# Patient Record
Sex: MALE | Race: WHITE | Employment: OTHER | URBAN - METROPOLITAN AREA
[De-identification: names, ages, dates, MRNs, and addresses within clinical notes are randomized per-mention and may not be internally consistent; named-entity substitution may affect disease eponyms.]

---

## 2024-02-08 ENCOUNTER — LAB (OUTPATIENT)
Dept: LAB | Facility: HOSPITAL | Age: 69
End: 2024-02-08
Payer: COMMERCIAL

## 2024-02-08 ENCOUNTER — OFFICE VISIT (OUTPATIENT)
Dept: OTOLARYNGOLOGY | Facility: HOSPITAL | Age: 69
End: 2024-02-08
Payer: COMMERCIAL

## 2024-02-08 ENCOUNTER — APPOINTMENT (OUTPATIENT)
Dept: OTOLARYNGOLOGY | Facility: HOSPITAL | Age: 69
End: 2024-02-08
Payer: COMMERCIAL

## 2024-02-08 VITALS — TEMPERATURE: 97.5 F | WEIGHT: 184.1 LBS | HEIGHT: 69 IN | BODY MASS INDEX: 27.27 KG/M2

## 2024-02-08 DIAGNOSIS — K13.79 MASS OF ORAL CAVITY: ICD-10-CM

## 2024-02-08 LAB
CREAT SERPL-MCNC: 0.8 MG/DL (ref 0.5–1.3)
EGFRCR SERPLBLD CKD-EPI 2021: >90 ML/MIN/1.73M*2
HOLD SPECIMEN: NORMAL

## 2024-02-08 PROCEDURE — 36415 COLL VENOUS BLD VENIPUNCTURE: CPT

## 2024-02-08 PROCEDURE — 99205 OFFICE O/P NEW HI 60 MIN: CPT | Performed by: STUDENT IN AN ORGANIZED HEALTH CARE EDUCATION/TRAINING PROGRAM

## 2024-02-08 PROCEDURE — 1159F MED LIST DOCD IN RCRD: CPT | Performed by: STUDENT IN AN ORGANIZED HEALTH CARE EDUCATION/TRAINING PROGRAM

## 2024-02-08 PROCEDURE — 99215 OFFICE O/P EST HI 40 MIN: CPT | Performed by: STUDENT IN AN ORGANIZED HEALTH CARE EDUCATION/TRAINING PROGRAM

## 2024-02-08 PROCEDURE — 1036F TOBACCO NON-USER: CPT | Performed by: STUDENT IN AN ORGANIZED HEALTH CARE EDUCATION/TRAINING PROGRAM

## 2024-02-08 PROCEDURE — 82565 ASSAY OF CREATININE: CPT

## 2024-02-08 PROCEDURE — 1160F RVW MEDS BY RX/DR IN RCRD: CPT | Performed by: STUDENT IN AN ORGANIZED HEALTH CARE EDUCATION/TRAINING PROGRAM

## 2024-02-08 RX ORDER — LIDOCAINE 50 MG/G
PATCH TOPICAL
COMMUNITY
Start: 2023-12-05

## 2024-02-08 RX ORDER — TADALAFIL 20 MG/1
20 TABLET ORAL DAILY PRN
COMMUNITY
Start: 2024-01-23

## 2024-02-08 RX ORDER — LISINOPRIL AND HYDROCHLOROTHIAZIDE 10; 12.5 MG/1; MG/1
1 TABLET ORAL
COMMUNITY

## 2024-02-08 ASSESSMENT — PATIENT HEALTH QUESTIONNAIRE - PHQ9
1. LITTLE INTEREST OR PLEASURE IN DOING THINGS: NOT AT ALL
SUM OF ALL RESPONSES TO PHQ9 QUESTIONS 1 & 2: 0
2. FEELING DOWN, DEPRESSED OR HOPELESS: NOT AT ALL

## 2024-02-08 NOTE — PROGRESS NOTES
Chief Complaint:    Chief Complaint   Patient presents with    Follow-up     Left side neck mass-biopsy results.       History of Present Illness:  68 y.o. male presents to [facility] on 02/08/24 with concern for a left oropharyngeal cancer.  The patient is unsure when his original symptoms began, but he noticed a mass in his left neck about a month ago after he went for massage.  Since then he has been having some increased discomfort in the area.  He was seen and referred by Dr. Bailey who he saw Monday.  A CT neck was done showing a large left base of tongue and oropharynx lesion as well as bilateral lymphadenopathy.  It appears that a biopsy of the left oropharynx lesion was done in the office-pathology is still pending.    The patient reports moderate dysphagia but has not noticed significant weight loss.  He denies significant difficulty breathing but his wife notes that he has been snoring more.    The patient's CT neck was reviewed which showed a large left tonsil on and base of tongue mass with the bilateral necrotic lymph nodes, left greater than right.    The patient denies smoking.  He does drink daily.    Past Medical History  No past medical history on file.    Past Surgical History  No past surgical history on file.    Social History  Social History     Socioeconomic History    Marital status: Single     Spouse name: Not on file    Number of children: Not on file    Years of education: Not on file    Highest education level: Not on file   Occupational History    Not on file   Tobacco Use    Smoking status: Never    Smokeless tobacco: Never   Substance and Sexual Activity    Alcohol use: Not on file    Drug use: Not on file    Sexual activity: Not on file   Other Topics Concern    Not on file   Social History Narrative    Not on file     Social Determinants of Health     Financial Resource Strain: Not on file   Food Insecurity: Not on file   Transportation Needs: Not  "on file   Physical Activity: Not on file   Stress: Not on file   Social Connections: Not on file   Intimate Partner Violence: Not on file   Housing Stability: Not on file       Family History  No family history on file.    Medications:  Current Outpatient Medications   Medication Sig Dispense Refill    lidocaine (Lidoderm) 5 % patch USE 1 PATCH EXTERNALLY ONCE DAILY MAY BE APPLIED FOR UP TO 12 HOURS IN A 24 HOUR PERIOD      lisinopriL-hydrochlorothiazide 10-12.5 mg tablet Take 1 tablet by mouth once daily in the morning. Take before meals.      tadalafil 20 mg tablet Take 1 tablet (20 mg) by mouth once daily as needed for erectile dysfunction.       No current facility-administered medications for this visit.       Allergies: Patient has no known allergies.    Immunizations:   There is no immunization history on file for this patient.     Review of Systems:  Constitutional: Negative for fever, weight loss and weight gain  HENT: Negative for ear pain, sore throat and hoarseness.  Mild difficulty swallowing  Cardiovascular: Negative for chest pain and dyspnea on exertion (Can climb up 2 floors)  Respiratory: Is not experiencing shortness of breath  Gastrointestinal: Negative for nausea and vomiting  Neurological: Negative for headaches.   Psychiatric: The patient is not nervous/anxious   Musculoskeletal: Denies muscle pain/weakness  Heme/Lymph: Negative for lymph nodes, easy bruising    Physical Exam  Vital Signs:  Temp 36.4 °C (97.5 °F)   Ht 1.753 m (5' 9\")   Wt 83.5 kg (184 lb 1.6 oz)   BMI 27.19 kg/m²     General:  Well-developed, well-nourished. No distress.  Average habitus.  Family in room  Communication and Voice:  Clear pitch and clarity  Respiratory  Respiratory effort:  Equal inspiration and expiration without stridor  Cardiovascular  Peripheral Vascular:  Warm extremities with equal pulses  Neuro: Patient oriented to person, place, and time;  Appropriate mood and affect;  Gait is intact with no " imbalance; Cranial nerves II-XII are intact  Head and Face  Inspection:  Normocephalic and atraumatic without mass or lesion  Palpation:  Facial skeleton intact without bony stepoffs  Facial Strength:  Facial motility symmetric and full bilaterally  Eyes: PERRLA. No nystagmus with normal extraocular motion bilaterally  ENT  Pinna:  External ear intact and fully developed  External canal:  Canal is patent with intact skin  Tympanic Membrane:  Clear and mobile  External nose:  No scar or anatomic deformity  Internal Nose:  Septum intact and midline.  No edema, polyp, or rhinorrhea.  TMJ:  No pain to palpation with full mobility  Salivary Glands:  No mass or tenderness  Lips: No lesion.  Oral cavity: No mass or lesion.  Upper and lower teeth present  Oropharynx: There is a obvious approximately 2 to 3 cm asymmetric fullness to the left tonsil compared to the right.  This seems to extend to the base of tongue   Neck  Trachea:  Midline trachea.  Thyroid:  No mass or nodularity  Lymphatics: Bilateral cervical lymphadenopathy, left greater than right    Procedure note: Recommended flexible nasopharyngoscopy. Risks, benefits, and alternatives were explained.   Procedure: Flexible nasopharyngoscopy   Indication: Left oropharynx lesion  Anesthesia: 4% lidocaine and 0.5% phenylephrine   After adequate Afrin and lidocaine spray advance the flexible endoscope. I was able to visualize the nasal cavity and nasopharynx. The findings were notable for the following:  No masses/lesions/ulcers in the nasopharynx.  There is an obvious left tonsil mass that extends to the left base of tongue.  This mass is extending down to but not into the vallecula.  It is causing some obstruction over the airway, but the epiglottis as well as vocal cords appear normal distal to the mass.  Bilateral vocal cord motion with complete glottic closure. No laryngeal lesions. No pooling of secretions.    Impression/Plan:  68 y.o. male presents to [place]  [facility] on 02/08/24 with concern for a left oropharyngeal cancer.  The patient appears to have a very obvious left oropharynx mass and bilateral neck lymphadenopathy that is concerning for an squamous cell carcinoma.  I suspect that chemoradiation would be the best course of treatment    -Will follow-up on the patient's recent biopsy results   -Will obtain a PET scan  -Will arrange for patient to see dental, medical oncology, and radiation oncology  -Will plan for virtual follow-up in 2 weeks

## 2024-02-26 ENCOUNTER — HOSPITAL ENCOUNTER (OUTPATIENT)
Dept: RADIOLOGY | Facility: HOSPITAL | Age: 69
Discharge: HOME | End: 2024-02-26
Payer: MEDICARE

## 2024-02-26 DIAGNOSIS — K13.79 MASS OF ORAL CAVITY: ICD-10-CM

## 2024-02-26 LAB — GLUCOSE BLD MANUAL STRIP-MCNC: 95 MG/DL (ref 74–99)

## 2024-02-26 PROCEDURE — 78815 PET IMAGE W/CT SKULL-THIGH: CPT | Mod: PET TUMOR INIT TX STRAT | Performed by: NUCLEAR MEDICINE

## 2024-02-26 PROCEDURE — A9552 F18 FDG: HCPCS | Performed by: STUDENT IN AN ORGANIZED HEALTH CARE EDUCATION/TRAINING PROGRAM

## 2024-02-26 PROCEDURE — 3430000001 HC RX 343 DIAGNOSTIC RADIOPHARMACEUTICALS: Performed by: STUDENT IN AN ORGANIZED HEALTH CARE EDUCATION/TRAINING PROGRAM

## 2024-02-26 PROCEDURE — 78815 PET IMAGE W/CT SKULL-THIGH: CPT | Mod: PI

## 2024-02-26 PROCEDURE — 82947 ASSAY GLUCOSE BLOOD QUANT: CPT

## 2024-02-26 RX ORDER — FLUDEOXYGLUCOSE F 18 200 MCI/ML
12.34 INJECTION, SOLUTION INTRAVENOUS
Status: COMPLETED | OUTPATIENT
Start: 2024-02-26 | End: 2024-02-26

## 2024-02-26 RX ADMIN — FLUDEOXYGLUCOSE F 18 12.34 MILLICURIE: 200 INJECTION, SOLUTION INTRAVENOUS at 11:00

## 2024-02-29 ENCOUNTER — OFFICE VISIT (OUTPATIENT)
Dept: OTOLARYNGOLOGY | Facility: HOSPITAL | Age: 69
End: 2024-02-29
Payer: MEDICARE

## 2024-02-29 VITALS — BODY MASS INDEX: 27.11 KG/M2 | TEMPERATURE: 96.6 F | WEIGHT: 183 LBS | HEIGHT: 69 IN

## 2024-02-29 DIAGNOSIS — K13.79 MASS OF ORAL CAVITY: Primary | ICD-10-CM

## 2024-02-29 DIAGNOSIS — R13.12 OROPHARYNGEAL DYSPHAGIA: ICD-10-CM

## 2024-02-29 DIAGNOSIS — C10.9 MALIGNANT NEOPLASM OF OROPHARYNX (MULTI): ICD-10-CM

## 2024-02-29 PROCEDURE — 1036F TOBACCO NON-USER: CPT | Performed by: STUDENT IN AN ORGANIZED HEALTH CARE EDUCATION/TRAINING PROGRAM

## 2024-02-29 PROCEDURE — 1160F RVW MEDS BY RX/DR IN RCRD: CPT | Performed by: STUDENT IN AN ORGANIZED HEALTH CARE EDUCATION/TRAINING PROGRAM

## 2024-02-29 PROCEDURE — 1159F MED LIST DOCD IN RCRD: CPT | Performed by: STUDENT IN AN ORGANIZED HEALTH CARE EDUCATION/TRAINING PROGRAM

## 2024-02-29 PROCEDURE — 99215 OFFICE O/P EST HI 40 MIN: CPT | Performed by: STUDENT IN AN ORGANIZED HEALTH CARE EDUCATION/TRAINING PROGRAM

## 2024-02-29 RX ORDER — BISMUTH SUBSALICYLATE 262 MG
1 TABLET,CHEWABLE ORAL DAILY
COMMUNITY

## 2024-02-29 ASSESSMENT — PATIENT HEALTH QUESTIONNAIRE - PHQ9
2. FEELING DOWN, DEPRESSED OR HOPELESS: NOT AT ALL
SUM OF ALL RESPONSES TO PHQ9 QUESTIONS 1 & 2: 0
1. LITTLE INTEREST OR PLEASURE IN DOING THINGS: NOT AT ALL

## 2024-02-29 NOTE — PROGRESS NOTES
Chief Complaint:    Chief Complaint   Patient presents with    Follow-up       History of Present Illness:  68 y.o. male presents to [facility] on 02/29/24 with concern for a left oropharyngeal cancer.  The patient is unsure when his original symptoms began, but he noticed a mass in his left neck about a month ago after he went for massage.  Since then he has been having some increased discomfort in the area.  He was seen and referred by Dr. Bailey who he saw Monday.  A CT neck was done showing a large left base of tongue and oropharynx lesion as well as bilateral lymphadenopathy.  It appears that a biopsy of the left oropharynx lesion was done in the office-pathology is still pending.    The patient reports moderate dysphagia but has not noticed significant weight loss.  He denies significant difficulty breathing but his wife notes that he has been snoring more.    The patient's CT neck was reviewed which showed a large left tonsil on and base of tongue mass with the bilateral necrotic lymph nodes, left greater than right.    The patient denies smoking.  He does drink daily.    02/29/24  The patient presents today for follow-up.  Since his last visit, his pathology has returned as:    Left base of tongue, biopsy:  -Invasive adenosquamous carcinoma, HPV associated (see comment).    The patient also had a PET scan showing:    IMPRESSION:  1. Large FDG avid lesion in the left oropharynx involving the tongue  and pharyngeal space consistent with biopsy-proven adenosquamous  carcinoma. There are multiple bilateral FDG avid lymph nodes  representing lymph node metastases.  2. There are a few hypodense FDG avid lesions throughout the liver  which are concerning for metastatic disease.  3. No evidence of additional FDG avid lymphadenopathy or additional  metastatic disease.    The patient is establish care with Dr. Brown and Dr. Bermudez from radiation and medical oncology.  The patient is  scheduled to start primary chemoradiation on 3/12/2024.  His breathing and eating and drinking has been okay.  He denies any weight loss.    Past Medical History  No past medical history on file.    Past Surgical History  No past surgical history on file.    Social History  Social History     Socioeconomic History    Marital status: Single     Spouse name: Not on file    Number of children: Not on file    Years of education: Not on file    Highest education level: Not on file   Occupational History    Not on file   Tobacco Use    Smoking status: Never    Smokeless tobacco: Never   Substance and Sexual Activity    Alcohol use: Not on file    Drug use: Not on file    Sexual activity: Not on file   Other Topics Concern    Not on file   Social History Narrative    Not on file     Social Determinants of Health     Financial Resource Strain: Not on file   Food Insecurity: Not on file   Transportation Needs: Not on file   Physical Activity: Not on file   Stress: Not on file   Social Connections: Not on file   Intimate Partner Violence: Not on file   Housing Stability: Not on file       Family History  No family history on file.    Medications:  Current Outpatient Medications   Medication Sig Dispense Refill    lidocaine (Lidoderm) 5 % patch USE 1 PATCH EXTERNALLY ONCE DAILY MAY BE APPLIED FOR UP TO 12 HOURS IN A 24 HOUR PERIOD      lisinopriL-hydrochlorothiazide 10-12.5 mg tablet Take 1 tablet by mouth once daily in the morning. Take before meals.      multivitamin tablet Take 1 tablet by mouth once daily.      tadalafil 20 mg tablet Take 1 tablet (20 mg) by mouth once daily as needed for erectile dysfunction.       No current facility-administered medications for this visit.       Allergies: Patient has no known allergies.    Immunizations:   There is no immunization history on file for this patient.     Review of Systems:  Constitutional: Negative for fever, weight loss and weight gain  HENT: Negative for ear pain, sore  "throat and hoarseness.  Mild difficulty swallowing  Cardiovascular: Negative for chest pain and dyspnea on exertion (Can climb up 2 floors)  Respiratory: Is not experiencing shortness of breath  Gastrointestinal: Negative for nausea and vomiting  Neurological: Negative for headaches.   Psychiatric: The patient is not nervous/anxious   Musculoskeletal: Denies muscle pain/weakness  Heme/Lymph: Negative for lymph nodes, easy bruising    Physical Exam  Vital Signs:  Temp 35.9 °C (96.6 °F)   Ht 1.753 m (5' 9\")   Wt 83 kg (183 lb)   BMI 27.02 kg/m²     General:  Well-developed, well-nourished. No distress.  Average habitus.  Family in room  Communication and Voice:  Clear pitch and clarity  Respiratory  Respiratory effort:  Equal inspiration and expiration without stridor  Cardiovascular  Peripheral Vascular:  Warm extremities with equal pulses  Neuro: Patient oriented to person, place, and time;  Appropriate mood and affect;  Gait is intact with no imbalance; Cranial nerves II-XII are intact  Head and Face  Inspection:  Normocephalic and atraumatic without mass or lesion  Palpation:  Facial skeleton intact without bony stepoffs  Facial Strength:  Facial motility symmetric and full bilaterally  Eyes: PERRLA. No nystagmus with normal extraocular motion bilaterally  ENT  Pinna:  External ear intact and fully developed  External canal:  Canal is patent with intact skin  Tympanic Membrane:  Clear and mobile  External nose:  No scar or anatomic deformity  Internal Nose:  Septum intact and midline.  No edema, polyp, or rhinorrhea.  TMJ:  No pain to palpation with full mobility  Salivary Glands:  No mass or tenderness  Lips: No lesion.  Oral cavity: No mass or lesion.  Upper and lower teeth present  Oropharynx: There is a obvious approximately 2 to 3 cm asymmetric fullness to the left tonsil compared to the right.  This seems to extend to the base of tongue   Neck  Trachea:  Midline trachea.  Thyroid:  No mass or " nodularity  Lymphatics: Bilateral cervical lymphadenopathy, left greater than right    *Previous scope exam  Procedure note: Recommended flexible nasopharyngoscopy. Risks, benefits, and alternatives were explained.   Procedure: Flexible nasopharyngoscopy   Indication: Left oropharynx lesion  Anesthesia: 4% lidocaine and 0.5% phenylephrine   After adequate Afrin and lidocaine spray advance the flexible endoscope. I was able to visualize the nasal cavity and nasopharynx. The findings were notable for the following:  No masses/lesions/ulcers in the nasopharynx.  There is an obvious left tonsil mass that extends to the left base of tongue.  This mass is extending down to but not into the vallecula.  It is causing some obstruction over the airway, but the epiglottis as well as vocal cords appear normal distal to the mass.  Bilateral vocal cord motion with complete glottic closure. No laryngeal lesions. No pooling of secretions.    Impression/Plan:  68 y.o. male presents to [facility] on 02/08/24 with a left oropharyngeal HPV associated adenosquamous carcinoma.  The patient appears to have a very obvious left oropharynx mass and bilateral neck lymphadenopathy that is concerning for an squamous cell carcinoma.    -The patient has seen dental and will be undergoing dental extractions this week  -Will also refer to speech therapy in anticipation for some voice and swallowing issues  -Patient will be getting primary chemo and radiation.  I did reach out to Dr. Bermudez's office given this concern for liver metastasis.  He will look into his chart and reach out to me  -We did discuss the possibility that he may need a trach and/or PEG during the course of his treatment.  I do not think that he requires this now.  The patient expressed understanding  -Will plan for virtual follow-up in 2 weeks

## 2024-03-06 ENCOUNTER — LAB REQUISITION (OUTPATIENT)
Dept: LAB | Facility: HOSPITAL | Age: 69
End: 2024-03-06
Payer: MEDICARE

## 2024-03-06 PROCEDURE — 88321 CONSLTJ&REPRT SLD PREP ELSWR: CPT | Performed by: DENTIST

## 2024-03-07 LAB
LABORATORY COMMENT REPORT: NORMAL
PATH REPORT.FINAL DX SPEC: NORMAL
PATH REPORT.GROSS SPEC: NORMAL
PATH REPORT.TOTAL CANCER: NORMAL

## 2024-03-08 ENCOUNTER — TUMOR BOARD CONFERENCE (OUTPATIENT)
Dept: HEMATOLOGY/ONCOLOGY | Facility: HOSPITAL | Age: 69
End: 2024-03-08
Payer: MEDICARE

## 2024-03-08 NOTE — TUMOR BOARD NOTE
[facility] HEAD AND NECK TUMOR BOARD NOTE:    Nolan Cortez Is a 68 y.o. male who was presented by Dr. Lima at [facility] Head & Neck Tumor Board on 3/8/24 which included representatives from all Head & Neck disciplines (Medical oncology/Radiation oncology/Otolaryngology/Radiology/Pathology).     History and Physical in Brief:  68 y.o M that presented with a mass in his left neck and associated discomfort. He endorses moderate dysphagia but denies weight loss, or difficulty breathing.     Nonsmoker, +daily alcohol use.     Scope exam: Left tonsil mass that extends to the left base of tongue. This mass is extending down to but not into the vallecula. It is causing some obstruction over the airway, but the epiglottis as well as vocal cords appear normal distal to the mass. Bilateral vocal cord motion with complete glottic closure. No laryngeal lesions. No pooling of secretions.     Imaging:  CT Neck with Contrast (1/29/24):   8 mm right thyroid nodule is present requiring no dedicated imaging follow-up. Included lung apices appear clear. Remodeled right clavicle deformity. Multilevel degenerative changes identified in the spine. Periapical lucency surrounding the left posterior maxillary molar. Periodontal disease seen surrounding the right central incisor of the mandible. There is a large mass which seems to involve the left tongue extending to the left base of tongue and involving the left tonsils and glossotonsillar sulcus. The mass largely obscures the left vallecula and displaces the oropharyngeal airway to the right. Mild airway narrowing. Mass is difficult to differentiate from surrounding soft tissues but measures approximately 5.9 x 4.2 cm. Necrotic bilateral pathologic adenopathy. The largest conglomerate of nodes on the left corresponds to palpable abnormality and measures 3.9 x 2.0 cm. A necrotic lymph node on the left measures 2 cm. Additional pathologic left-sided lymph nodes are  present. There is a necrotic right sided 1.7 cm lymph node which measures 1.9 cm.     PET/CT Head and Neck (2/26/24):   IMPRESSION:  1. Large FDG avid lesion in the left oropharynx involving the tongue  and pharyngeal space consistent with biopsy-proven adenosquamous  carcinoma. There are multiple bilateral FDG avid lymph nodes  representing lymph node metastases.  2. There are a few hypodense FDG avid lesions throughout the liver  which are concerning for metastatic disease.  3. No evidence of additional FDG avid lymphadenopathy or additional  metastatic disease.    Procedures to date:  2/5/24 in office biopsy of tongue by Dr. Bailey     Pertinent Pathology:      Component    FINAL DIAGNOSIS   [facility] ([id number] 02/05/2024)    Left tongue base, biopsy:  -- Invasive adenosquamous carcinoma, see note.     Note: Per report immunohistochemical staining performed at [facility] showed the lesional cells to be positive for p40 (majority, sparing luminal glandular cells), CK7 (glandular cells), and p16 (diffuse and strong). Also per report HPV high risk mRNA in situ hybridization studies showed granular cytoplasmic and punctate nuclear reactivity. Immunohistochemical stains and hybridization studies were not provided for review.           The [facility] Head and Neck Tumor Board considered available treatment options and made the following staging and recommendations:    Staging and Recommendations:    Site: left Oropharyngeal  p16 positive adenosquamous carcinoma  Stage: T4N2Mx  Recommendation: Chemotherapy and Radiation, biopsy/work up of liver lesions    Clinical Trial Status:   N/A      National site-specific guidelines were discussed with respect to the case.

## 2024-03-14 ENCOUNTER — TELEMEDICINE (OUTPATIENT)
Dept: OTOLARYNGOLOGY | Facility: HOSPITAL | Age: 69
End: 2024-03-14
Payer: MEDICARE

## 2024-03-14 DIAGNOSIS — C10.9 MALIGNANT NEOPLASM OF OROPHARYNX (MULTI): Primary | ICD-10-CM

## 2024-03-14 PROCEDURE — 1036F TOBACCO NON-USER: CPT | Performed by: STUDENT IN AN ORGANIZED HEALTH CARE EDUCATION/TRAINING PROGRAM

## 2024-03-14 PROCEDURE — 99213 OFFICE O/P EST LOW 20 MIN: CPT | Mod: 95 | Performed by: STUDENT IN AN ORGANIZED HEALTH CARE EDUCATION/TRAINING PROGRAM

## 2024-03-14 PROCEDURE — 1160F RVW MEDS BY RX/DR IN RCRD: CPT | Performed by: STUDENT IN AN ORGANIZED HEALTH CARE EDUCATION/TRAINING PROGRAM

## 2024-03-14 PROCEDURE — 99213 OFFICE O/P EST LOW 20 MIN: CPT | Performed by: STUDENT IN AN ORGANIZED HEALTH CARE EDUCATION/TRAINING PROGRAM

## 2024-03-14 PROCEDURE — 1159F MED LIST DOCD IN RCRD: CPT | Performed by: STUDENT IN AN ORGANIZED HEALTH CARE EDUCATION/TRAINING PROGRAM

## 2024-03-14 ASSESSMENT — PATIENT HEALTH QUESTIONNAIRE - PHQ9
2. FEELING DOWN, DEPRESSED OR HOPELESS: NOT AT ALL
SUM OF ALL RESPONSES TO PHQ9 QUESTIONS 1 & 2: 0
2. FEELING DOWN, DEPRESSED OR HOPELESS: NOT AT ALL
1. LITTLE INTEREST OR PLEASURE IN DOING THINGS: NOT AT ALL
SUM OF ALL RESPONSES TO PHQ9 QUESTIONS 1 & 2: 0
1. LITTLE INTEREST OR PLEASURE IN DOING THINGS: NOT AT ALL

## 2024-03-14 NOTE — PROGRESS NOTES
Chief Complaint:    No chief complaint on file.      History of Present Illness:  68 y.o. male presents to [facility] on 03/14/24 with concern for a left oropharyngeal cancer.  The patient is unsure when his original symptoms began, but he noticed a mass in his left neck about a month ago after he went for massage.  Since then he has been having some increased discomfort in the area.  He was seen and referred by Dr. Bailey who he saw Monday.  A CT neck was done showing a large left base of tongue and oropharynx lesion as well as bilateral lymphadenopathy.  It appears that a biopsy of the left oropharynx lesion was done in the office-pathology is still pending.    The patient reports moderate dysphagia but has not noticed significant weight loss.  He denies significant difficulty breathing but his wife notes that he has been snoring more.    The patient's CT neck was reviewed which showed a large left tonsil on and base of tongue mass with the bilateral necrotic lymph nodes, left greater than right.    The patient denies smoking.  He does drink daily.    His pathology has returned as:    Left base of tongue, biopsy:  -Invasive adenosquamous carcinoma, HPV associated (see comment).    The patient also had a PET scan showing:    IMPRESSION:  1. Large FDG avid lesion in the left oropharynx involving the tongue  and pharyngeal space consistent with biopsy-proven adenosquamous  carcinoma. There are multiple bilateral FDG avid lymph nodes  representing lymph node metastases.  2. There are a few hypodense FDG avid lesions throughout the liver  which are concerning for metastatic disease.  3. No evidence of additional FDG avid lymphadenopathy or additional  metastatic disease.    The patient is establish care with Dr. Brown and Dr. Bermudez from radiation and medical oncology.  The patient is scheduled to start primary chemoradiation on 3/12/2024.  His breathing and eating and drinking has  been okay.  He denies any weight loss.    03/15/24  Virtual follow-up was done with the patient today.  Spoke to Dr. Bermudez after his last visit given some of the findings in his liver.  The patient underwent a liver biopsy earlier this week-pathology is pending.  His treatment is scheduled to start as soon as his pathology result is back.  The patient is otherwise doing well.  He denies any significant dysphagia or breathing difficulties.    Past Medical History  No past medical history on file.    Past Surgical History  No past surgical history on file.    Social History  Social History     Socioeconomic History    Marital status: Single     Spouse name: Not on file    Number of children: Not on file    Years of education: Not on file    Highest education level: Not on file   Occupational History    Not on file   Tobacco Use    Smoking status: Never    Smokeless tobacco: Never   Substance and Sexual Activity    Alcohol use: Not on file    Drug use: Not on file    Sexual activity: Not on file   Other Topics Concern    Not on file   Social History Narrative    Not on file     Social Determinants of Health     Financial Resource Strain: Not on file   Food Insecurity: Not on file   Transportation Needs: Not on file   Physical Activity: Not on file   Stress: Not on file   Social Connections: Not on file   Intimate Partner Violence: Not on file   Housing Stability: Not on file       Family History  No family history on file.    Medications:  Current Outpatient Medications   Medication Sig Dispense Refill    lidocaine (Lidoderm) 5 % patch USE 1 PATCH EXTERNALLY ONCE DAILY MAY BE APPLIED FOR UP TO 12 HOURS IN A 24 HOUR PERIOD      lisinopriL-hydrochlorothiazide 10-12.5 mg tablet Take 1 tablet by mouth once daily in the morning. Take before meals.      multivitamin tablet Take 1 tablet by mouth once daily.      tadalafil 20 mg tablet Take 1 tablet (20 mg) by mouth once daily as needed for erectile dysfunction.       No  current facility-administered medications for this visit.       Allergies: Patient has no known allergies.    Immunizations:   There is no immunization history on file for this patient.     Impression/Plan:  68 y.o. 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  The patient appears to have a very obvious left oropharynx mass and bilateral neck lymphadenopathy that is concerning for an squamous cell carcinoma.    -We are awaiting the patient's liver biopsy results.  The patient will be initiating chemoradiation at an outside institution once this is back.  I asked the patient to let me know when these results are back and we will request the results from the outside hospital  -Will plan for virtual follow-up in 2 weeks

## 2024-03-15 ENCOUNTER — APPOINTMENT (OUTPATIENT)
Dept: OTOLARYNGOLOGY | Facility: CLINIC | Age: 69
End: 2024-03-15
Payer: COMMERCIAL

## 2024-04-04 ENCOUNTER — TELEMEDICINE (OUTPATIENT)
Dept: OTOLARYNGOLOGY | Facility: HOSPITAL | Age: 69
End: 2024-04-04
Payer: MEDICARE

## 2024-04-04 DIAGNOSIS — C10.9 MALIGNANT NEOPLASM OF OROPHARYNX (MULTI): Primary | ICD-10-CM

## 2024-04-04 PROCEDURE — 1159F MED LIST DOCD IN RCRD: CPT | Performed by: STUDENT IN AN ORGANIZED HEALTH CARE EDUCATION/TRAINING PROGRAM

## 2024-04-04 PROCEDURE — 99212 OFFICE O/P EST SF 10 MIN: CPT | Mod: 95 | Performed by: STUDENT IN AN ORGANIZED HEALTH CARE EDUCATION/TRAINING PROGRAM

## 2024-04-04 PROCEDURE — 1036F TOBACCO NON-USER: CPT | Performed by: STUDENT IN AN ORGANIZED HEALTH CARE EDUCATION/TRAINING PROGRAM

## 2024-04-04 PROCEDURE — 99212 OFFICE O/P EST SF 10 MIN: CPT | Performed by: STUDENT IN AN ORGANIZED HEALTH CARE EDUCATION/TRAINING PROGRAM

## 2024-04-04 PROCEDURE — 1160F RVW MEDS BY RX/DR IN RCRD: CPT | Performed by: STUDENT IN AN ORGANIZED HEALTH CARE EDUCATION/TRAINING PROGRAM

## 2024-04-04 NOTE — PROGRESS NOTES
Chief Complaint:    Chief Complaint   Patient presents with    Follow-up     Virtual visit oral cavity       History of Present Illness:  68 y.o. male presents to [facility] on 04/04/24 with concern for a left oropharyngeal cancer.  The patient is unsure when his original symptoms began, but he noticed a mass in his left neck about a month ago after he went for massage.  Since then he has been having some increased discomfort in the area.  He was seen and referred by Dr. Bailey who he saw Monday.  A CT neck was done showing a large left base of tongue and oropharynx lesion as well as bilateral lymphadenopathy.  It appears that a biopsy of the left oropharynx lesion was done in the office-pathology is still pending.    The patient reports moderate dysphagia but has not noticed significant weight loss.  He denies significant difficulty breathing but his wife notes that he has been snoring more.    The patient's CT neck was reviewed which showed a large left tonsil on and base of tongue mass with the bilateral necrotic lymph nodes, left greater than right.    The patient denies smoking.  He does drink daily.    His pathology has returned as:    Left base of tongue, biopsy:  -Invasive adenosquamous carcinoma, HPV associated (see comment).    The patient also had a PET scan showing:    IMPRESSION:  1. Large FDG avid lesion in the left oropharynx involving the tongue  and pharyngeal space consistent with biopsy-proven adenosquamous  carcinoma. There are multiple bilateral FDG avid lymph nodes  representing lymph node metastases.  2. There are a few hypodense FDG avid lesions throughout the liver  which are concerning for metastatic disease.  3. No evidence of additional FDG avid lymphadenopathy or additional  metastatic disease.    The patient is establish care with Dr. Brown and Dr. Bermudez from radiation and medical oncology.  The patient is scheduled to start primary chemoradiation on  3/12/2024.  His breathing and eating and drinking has been okay.  He denies any weight loss.    04/04/24  Virtual visit was done with the patient today.  Unfortunately, the patient's liver biopsy came back as consistent with a squamous cell carcinoma.  He is continue to work with Dr. Bermudez and his local medical and radiation oncologist.  It sounds like he will be starting therapy next week.  As of now, it sounds like he will still be getting radiation and chemotherapy.  He will confirm this and get back to me.    Past Medical History  No past medical history on file.    Past Surgical History  No past surgical history on file.    Social History  Social History     Socioeconomic History    Marital status: Single     Spouse name: Not on file    Number of children: Not on file    Years of education: Not on file    Highest education level: Not on file   Occupational History    Not on file   Tobacco Use    Smoking status: Never    Smokeless tobacco: Never   Substance and Sexual Activity    Alcohol use: Not on file    Drug use: Not on file    Sexual activity: Not on file   Other Topics Concern    Not on file   Social History Narrative    Not on file     Social Determinants of Health     Financial Resource Strain: Not on file   Food Insecurity: Not on file   Transportation Needs: Not on file   Physical Activity: Not on file   Stress: Not on file   Social Connections: Not on file   Intimate Partner Violence: Not on file   Housing Stability: Not on file       Family History  No family history on file.    Medications:  Current Outpatient Medications   Medication Sig Dispense Refill    lidocaine (Lidoderm) 5 % patch USE 1 PATCH EXTERNALLY ONCE DAILY MAY BE APPLIED FOR UP TO 12 HOURS IN A 24 HOUR PERIOD      lisinopriL-hydrochlorothiazide 10-12.5 mg tablet Take 1 tablet by mouth once daily in the morning. Take before meals.      multivitamin tablet Take 1 tablet by mouth once daily.      tadalafil 20 mg tablet Take 1 tablet (20  mg) by mouth once daily as needed for erectile dysfunction.       No current facility-administered medications for this visit.       Allergies: Patient has no known allergies.    Immunizations:   There is no immunization history on file for this patient.     Impression/Plan:  68 y.o. 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      -The patient will be initiating therapy next week.  I am unsure whether this will be chemo and radiation or just chemotherapy with or without immunotherapy given his known metastasis to the liver.  This will be discussed with his oncologist  -Will plan for virtual follow-up in 2 weeks

## 2024-04-18 ENCOUNTER — TELEMEDICINE (OUTPATIENT)
Dept: OTOLARYNGOLOGY | Facility: HOSPITAL | Age: 69
End: 2024-04-18
Payer: MEDICARE

## 2024-04-18 DIAGNOSIS — C10.9 MALIGNANT NEOPLASM OF OROPHARYNX (MULTI): Primary | ICD-10-CM

## 2024-04-18 PROCEDURE — 99212 OFFICE O/P EST SF 10 MIN: CPT | Mod: 95 | Performed by: STUDENT IN AN ORGANIZED HEALTH CARE EDUCATION/TRAINING PROGRAM

## 2024-04-18 PROCEDURE — 1159F MED LIST DOCD IN RCRD: CPT | Performed by: STUDENT IN AN ORGANIZED HEALTH CARE EDUCATION/TRAINING PROGRAM

## 2024-04-18 PROCEDURE — 1036F TOBACCO NON-USER: CPT | Performed by: STUDENT IN AN ORGANIZED HEALTH CARE EDUCATION/TRAINING PROGRAM

## 2024-04-18 PROCEDURE — 1160F RVW MEDS BY RX/DR IN RCRD: CPT | Performed by: STUDENT IN AN ORGANIZED HEALTH CARE EDUCATION/TRAINING PROGRAM

## 2024-04-18 PROCEDURE — 99212 OFFICE O/P EST SF 10 MIN: CPT | Performed by: STUDENT IN AN ORGANIZED HEALTH CARE EDUCATION/TRAINING PROGRAM

## 2024-04-18 RX ORDER — ONDANSETRON 4 MG/1
4 TABLET, ORALLY DISINTEGRATING ORAL EVERY 8 HOURS PRN
COMMUNITY
Start: 2024-04-15

## 2024-04-18 NOTE — PROGRESS NOTES
Chief Complaint:    No chief complaint on file.      History of Present Illness:  68 y.o. male presents to [facility] on 04/18/24 with concern for a left oropharyngeal cancer.  The patient is unsure when his original symptoms began, but he noticed a mass in his left neck about a month ago after he went for massage.  Since then he has been having some increased discomfort in the area.  He was seen and referred by Dr. Bailey who he saw Monday.  A CT neck was done showing a large left base of tongue and oropharynx lesion as well as bilateral lymphadenopathy.  It appears that a biopsy of the left oropharynx lesion was done in the office-pathology is still pending.    The patient reports moderate dysphagia but has not noticed significant weight loss.  He denies significant difficulty breathing but his wife notes that he has been snoring more.    The patient's CT neck was reviewed which showed a large left tonsil on and base of tongue mass with the bilateral necrotic lymph nodes, left greater than right.    The patient denies smoking.  He does drink daily.    His pathology has returned as:    Left base of tongue, biopsy:  -Invasive adenosquamous carcinoma, HPV associated (see comment).    The patient also had a PET scan showing:    IMPRESSION:  1. Large FDG avid lesion in the left oropharynx involving the tongue  and pharyngeal space consistent with biopsy-proven adenosquamous  carcinoma. There are multiple bilateral FDG avid lymph nodes  representing lymph node metastases.  2. There are a few hypodense FDG avid lesions throughout the liver  which are concerning for metastatic disease.  3. No evidence of additional FDG avid lymphadenopathy or additional  metastatic disease.    The patient is establish care with Dr. Brown and Dr. Bermudez from radiation and medical oncology.  The patient is scheduled to start primary chemoradiation on 3/12/2024.  His breathing and eating and drinking has  been okay.  He denies any weight loss.    Unfortunately, the patient's liver biopsy came back as consistent with a squamous cell carcinoma.  He is continue to work with Dr. Bermudez and his local medical and radiation oncologist.      04/18/24  Virtual visit was done with the patient today.  He has started what sounds like chemotherapy (possibly immunotherapy?)  without radiation at this time.  He seems to be tolerating the treatment well.  He has noticed the tumor seems a bit bigger but he denies any significant breathing difficulties or problems with p.o. intake.  He still continues to be in good spirits.    Past Medical History  No past medical history on file.    Past Surgical History  No past surgical history on file.    Social History  Social History     Socioeconomic History    Marital status: Single     Spouse name: Not on file    Number of children: Not on file    Years of education: Not on file    Highest education level: Not on file   Occupational History    Not on file   Tobacco Use    Smoking status: Never    Smokeless tobacco: Never   Substance and Sexual Activity    Alcohol use: Not on file    Drug use: Not on file    Sexual activity: Not on file   Other Topics Concern    Not on file   Social History Narrative    Not on file     Social Determinants of Health     Financial Resource Strain: Low Risk  (2/14/2024)    Received from [facility]    Overall Financial Resource Strain (CARDIA)     Difficulty of Paying Living Expenses: Not hard at all   Food Insecurity: No Food Insecurity (2/14/2024)    Received from [facility]    Hunger Vital Sign     Worried About Running Out of Food in the Last Year: Never true     Ran Out of Food in the Last Year: Never true   Transportation Needs: No Transportation Needs (2/14/2024)    Received from [facility]    PRAPARE - Transportation     Lack of Transportation (Medical): No     Lack of Transportation (Non-Medical): No   Physical Activity: Inactive  (2/14/2024)    Received from [facility]    Exercise Vital Sign     Days of Exercise per Week: 0 days     Minutes of Exercise per Session: 0 min   Stress: No Stress Concern Present (2/14/2024)    Received from [facility]    [place] [employer] - Occupational Stress Questionnaire     Feeling of Stress : Only a little   Social Connections: Moderately Isolated (2/14/2024)    Received from [facility]    Social Connection and Isolation Panel [NHANES]     Frequency of Communication with Friends and Family: More than three times a week     Frequency of Social Gatherings with Friends and Family: More than three times a week     Attends Nondenominational Services: Never     Active Member of Clubs or Organizations: Yes     Attends Club or Organization Meetings: More than 4 times per year     Marital Status: Never    Intimate Partner Violence: Not on file   Housing Stability: Unknown (2/14/2024)    Received from [facility]    Housing Stability Vital Sign     Unable to Pay for Housing in the Last Year: No     Number of Places Lived in the Last Year: Not on file     Unstable Housing in the Last Year: No       Family History  No family history on file.    Medications:  Current Outpatient Medications   Medication Sig Dispense Refill    lidocaine (Lidoderm) 5 % patch USE 1 PATCH EXTERNALLY ONCE DAILY MAY BE APPLIED FOR UP TO 12 HOURS IN A 24 HOUR PERIOD      lisinopriL-hydrochlorothiazide 10-12.5 mg tablet Take 1 tablet by mouth once daily in the morning. Take before meals.      multivitamin tablet Take 1 tablet by mouth once daily.      ondansetron ODT (Zofran-ODT) 4 mg disintegrating tablet Take 1 tablet (4 mg) by mouth every 8 hours if needed for nausea.      tadalafil 20 mg tablet Take 1 tablet (20 mg) by mouth once daily as needed for erectile dysfunction.       No current facility-administered medications for this visit.       Allergies: Patient has no known  allergies.    Immunizations:   There is no immunization history on file for this patient.     Impression/Plan:  68 y.o. 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      -The patient will continue systemic therapy with medical oncology  -It has been a while since I have seen him in person.  I will plan for an in person visit for him sometime in May

## 2024-05-21 ENCOUNTER — APPOINTMENT (OUTPATIENT)
Dept: OTOLARYNGOLOGY | Facility: HOSPITAL | Age: 69
End: 2024-05-21
Payer: MEDICARE